# Patient Record
Sex: FEMALE
[De-identification: names, ages, dates, MRNs, and addresses within clinical notes are randomized per-mention and may not be internally consistent; named-entity substitution may affect disease eponyms.]

---

## 2024-09-10 PROBLEM — Z00.00 ENCOUNTER FOR PREVENTIVE HEALTH EXAMINATION: Status: ACTIVE | Noted: 2024-09-10

## 2024-09-13 ENCOUNTER — APPOINTMENT (OUTPATIENT)
Dept: NEUROSURGERY | Facility: CLINIC | Age: 69
End: 2024-09-13
Payer: SELF-PAY

## 2024-09-13 ENCOUNTER — TRANSCRIPTION ENCOUNTER (OUTPATIENT)
Age: 69
End: 2024-09-13

## 2024-09-13 DIAGNOSIS — G93.89 OTHER SPECIFIED DISORDERS OF BRAIN: ICD-10-CM

## 2024-09-13 DIAGNOSIS — I10 ESSENTIAL (PRIMARY) HYPERTENSION: ICD-10-CM

## 2024-09-13 DIAGNOSIS — E78.5 HYPERLIPIDEMIA, UNSPECIFIED: ICD-10-CM

## 2024-09-13 DIAGNOSIS — Z80.0 FAMILY HISTORY OF MALIGNANT NEOPLASM OF DIGESTIVE ORGANS: ICD-10-CM

## 2024-09-13 DIAGNOSIS — Z87.891 PERSONAL HISTORY OF NICOTINE DEPENDENCE: ICD-10-CM

## 2024-09-13 PROCEDURE — 99204 OFFICE O/P NEW MOD 45 MIN: CPT

## 2024-09-13 RX ORDER — EZETIMIBE 10 MG/1
10 TABLET ORAL
Refills: 0 | Status: ACTIVE | COMMUNITY

## 2024-09-13 RX ORDER — LISINOPRIL 5 MG/1
5 TABLET ORAL
Refills: 0 | Status: ACTIVE | COMMUNITY

## 2024-09-13 RX ORDER — MELOXICAM 15 MG/1
15 TABLET ORAL
Refills: 0 | Status: ACTIVE | COMMUNITY

## 2024-09-13 RX ORDER — ROSUVASTATIN CALCIUM 20 MG/1
20 TABLET, FILM COATED ORAL
Refills: 0 | Status: ACTIVE | COMMUNITY

## 2024-09-13 RX ORDER — CITALOPRAM HYDROBROMIDE 10 MG/1
10 TABLET, FILM COATED ORAL
Refills: 0 | Status: ACTIVE | COMMUNITY

## 2024-09-13 NOTE — HISTORY OF PRESENT ILLNESS
[de-identified] : SAMIA AKBAR  is a 69 year old right handed female with PMH HTN, Depression, Pre-Diabetes, with complaints of tonic conic seizure monday night labor day 9/2/24 intubated by ambulance for airway compromise. CT done in [facility]. At the hospital diagnosed with a left sided brain tumor and admitted for biopsy 9/6/24 and workup. PATH pending. Patient denies any prodrome prior to her sizure episode.    Neurosurgeon: Magy Álvarez MD  Patient accompanied by sons PCP/Specialist: Referred by: Friend of 's

## 2024-09-13 NOTE — ASSESSMENT
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  I had a long discussion with the patient regarding the role of follow-up once PATH comes back.  The patient was extensively educated about the nature of her disease process. Therapeutic and diagnostic tests include MRI for radiation planning.  The patient should see her oncology and radiation team to set up chemoRT ASAP.  I will see the patient back in after her repeat MRI is completed and Pathology is done. With medical records sent to our office. She may be a good clinical trial visit.

## 2024-09-13 NOTE — DATA REVIEWED
[de-identified] : I have reviewed the most recent MRI on 9/3/24 at [facility] which shows mass with T2/flair hyperintensity in the left inferior lateral frontal lobe, involving left inferior lateral frontal lobe , anterior aspect of left temporal lobe, left insula, left gangliocapsular region and left thalamus.

## 2024-09-13 NOTE — DATA REVIEWED
[de-identified] : I have reviewed the most recent MRI on 9/3/24 at [facility] which shows mass with T2/flair hyperintensity in the left inferior lateral frontal lobe, involving left inferior lateral frontal lobe , anterior aspect of left temporal lobe, left insula, left gangliocapsular region and left thalamus.

## 2024-09-13 NOTE — PHYSICAL EXAM
[General Appearance - In No Acute Distress] : in no acute distress [General Appearance - Alert] : alert [Oriented To Time, Place, And Person] : oriented to person, place, and time [Person] : oriented to person [Place] : oriented to place [Time] : oriented to time

## 2024-09-13 NOTE — HISTORY OF PRESENT ILLNESS
[de-identified] : SAMIA AKBAR  is a 69 year old right handed female with PMH HTN, Depression, Pre-Diabetes, with complaints of tonic conic seizure monday night labor day 9/2/24 intubated by ambulance for airway compromise. CT done in [facility]. At the hospital diagnosed with a left sided brain tumor and admitted for biopsy 9/6/24 and workup. PATH pending. Patient denies any prodrome prior to her sizure episode.    Neurosurgeon: Magy Álvarez MD  Patient accompanied by sons PCP/Specialist: Referred by: Friend of 's